# Patient Record
Sex: MALE | Race: WHITE
[De-identification: names, ages, dates, MRNs, and addresses within clinical notes are randomized per-mention and may not be internally consistent; named-entity substitution may affect disease eponyms.]

---

## 2019-11-09 ENCOUNTER — HOSPITAL ENCOUNTER (EMERGENCY)
Dept: HOSPITAL 33 - ED | Age: 44
Discharge: HOME | End: 2019-11-09
Payer: COMMERCIAL

## 2019-11-09 VITALS — OXYGEN SATURATION: 95 % | DIASTOLIC BLOOD PRESSURE: 92 MMHG | SYSTOLIC BLOOD PRESSURE: 135 MMHG | HEART RATE: 89 BPM

## 2019-11-09 DIAGNOSIS — R21: Primary | ICD-10-CM

## 2019-11-09 PROCEDURE — 96372 THER/PROPH/DIAG INJ SC/IM: CPT

## 2019-11-09 PROCEDURE — 99283 EMERGENCY DEPT VISIT LOW MDM: CPT

## 2019-11-09 NOTE — ERPHSYRPT
- History of Present Illness


Time Seen by Provider: 11/09/19 20:07


Source: patient


Exam Limitations: no limitations


Patient Subjective Stated Complaint: c/o skin rash on both hands and legs 

started today, unable to find cause


Physician History: 


44-year-old male without any significant past medical history started having 

maculopapular rash on his both upper extremity and both lower extremities.  

Patient denies any fever, chills or any other symptoms.  Patient states that 

this is the first time he has developed skin rash like this. 


Timing/Duration: today


Quality: itchy


Location: hands, feet


Possible Causes: no cause identified


Modifying Factors: Improves With: antihistamine


Allergies/Adverse Reactions: 








No Known Drug Allergies Allergy (Unverified 04/14/16 00:08)


 





Hx Tetanus, Diphtheria Vaccination/Date Given: Yes


Hx Influenza Vaccination/Date Given: Yes


Hx Pneumococcal Vaccination/Date Given: No





- Review of Systems


Constitutional: No Fever, No Chills


Eyes: No Symptoms


Ears, Nose, & Throat: No Symptoms


Respiratory: No Cough, No Dyspnea


Cardiac: No Chest Pain, No Edema, No Syncope


Abdominal/Gastrointestinal: No Abdominal Pain, No Nausea, No Vomiting, No 

Diarrhea


Genitourinary Symptoms: No Dysuria


Musculoskeletal: No Back Pain, No Neck Pain


Skin: Rash


Neurological: No Dizziness, No Focal Weakness, No Sensory Changes


Psychological: No Symptoms


Endocrine: No Symptoms


All Other Systems: Reviewed and Negative





- Past Medical History


Pertinent Past Medical History: No





- Past Surgical History


Past Surgical History: Yes


Other Surgical History: 1994 right knee scope/torn miniscus





- Social History


Smoking Status: Never smoker


Exposure to second hand smoke: No


Drug Use: none


Patient Lives Alone: No





- Physical Exam


General Appearance: no apparent distress, alert


Eye Exam: PERRL/EOMI, eyes nml inspection


Ears, Nose, Throat Exam: normal ENT inspection, pharynx normal, moist mucous 

membranes


Neck Exam: normal inspection, non-tender, supple, full range of motion


Respiratory Exam: normal breath sounds, lungs clear, No respiratory distress


Cardiovascular Exam: regular rate/rhythm, normal heart sounds


Gastrointestinal/Abdomen Exam: soft, mass, No tenderness


Back Exam: normal inspection, normal range of motion, No CVA tenderness, No 

vertebral tenderness


Extremity Exam: normal inspection, normal range of motion


Neurologic Exam: alert, oriented x 3, cooperative, normal mood/affect, 

sensation nml, No motor deficits


Skin Exam: normal color, warm, dry, rash





- Course


Nursing assessment & vital signs reviewed: Yes


Ordered Tests: 





Medication Summary














Discontinued Medications














Generic Name Dose Route Start Last Admin





  Trade Name Elsie  PRN Reason Stop Dose Admin


 


Betamethasone Acet/Betameth SodPhos  12 mg  11/09/19 20:02  





  Celestone Soluspan 6mg/Ml***  IM  11/09/19 20:03  





  STAT ONE   





     





     





     





     














- Progress


Progress: unchanged


Counseled pt/family regarding: diagnosis, need for follow-up





- Departure


Departure Disposition: Home


Clinical Impression: 


 Rash and nonspecific skin eruption





Condition: Stable


Critical Care Time: No


Referrals: 


DEVAUGHN KNIGHT [Primary Care Provider] - 


Instructions:  Skin Rash (DC)


Additional Instructions: 


RASH





1.  Depending on the reason for the rash, the instructions will differ.


2.  If an antibiotic has been prescribed, take it as directed until gone.


3.  If anti-fungals or shampoos are prescribed, use only as directed and follow 

specific instructions on package container.


4.  Avoid hot showers/baths, as this may increase itching.


5.  Calamine lotion or Aveeno Oatmeal baths may help itching.


6.  See your family physician if these signs or symptoms persist for more than 

four days.





Discharge/Care Plan





THAISVERNON CAMACHO was seen on 11/09/19 in the Emergency Room. The patient 

was counseled regarding Diagnosis,Lab results, Imaging studies, need for follow 

up and when to return to the Emergency Room.





Prescriptions given:





Discharge Note





I have spoken with the patient and/or caregivers. I have explained the patient'

s condition, diagnosis and treatment plan based on the information available to 

me at this time. I have answered the patient's and/or caregiver's questions and 

addressed any concerns. The patient and/or caregivers have as good 

understanding of the patient's diagnosis, condition and treatment plan as can 

be expected at this point. The vital signs have been stable. The patient's 

condition is stable and appropriate for discharge from the emergency department.





The patient will pursue further outpatient evaluation with the primary care 

physician or other designated or consulting physician as outlined in the 

discharge instructions. The patient and/or caregivers are agreeable to this 

plan of care and follow-up instructions have been explained in detail. The 

patient and/or caregivers have received these instruction. The patient/and or 

caregivers are aware that any significant change in condition or worsening of 

symptoms should prompt an immediate return to this or the closest emergency 

department or call 911. 








Prescriptions: 


Triamcinolone 0.1% Cream*** [Kenalog 0.1% Cream 15 gm***] 1 gm TP QID #80 cream